# Patient Record
Sex: FEMALE | URBAN - METROPOLITAN AREA
[De-identification: names, ages, dates, MRNs, and addresses within clinical notes are randomized per-mention and may not be internally consistent; named-entity substitution may affect disease eponyms.]

---

## 2018-11-16 ENCOUNTER — IMPORTED ENCOUNTER (OUTPATIENT)
Dept: URBAN - METROPOLITAN AREA CLINIC 9 | Facility: CLINIC | Age: 33
End: 2018-11-16

## 2019-08-15 NOTE — PATIENT DISCUSSION
Monitor with Dr Robinson Jin. Try new RGP to get improved VA. IF not, may be cataract per Dr Robinson Jin. Wants RGP for OU.

## 2019-08-29 NOTE — PATIENT DISCUSSION
Monitor with Dr Leni Chance. Try new RGP to get improved VA. IF not, may be cataract per Dr Leni Chance. Wants RGP for OU.

## 2019-12-12 NOTE — PATIENT DISCUSSION
Monitor with Dr Brigido Latif. Try new RGP to get improved VA. IF not, may be cataract per Dr Brigido Latif. Wants RGP for OU.

## 2021-10-16 ASSESSMENT — VISUAL ACUITY
OD_SC: 20/40 SN
OD_CC: 20/20 SN
OS_CC: 20/20 SN
OS_SC: 20/40 SN

## 2021-10-16 ASSESSMENT — TONOMETRY
OS_IOP_MMHG: 13
OD_IOP_MMHG: 13

## 2021-10-16 ASSESSMENT — KERATOMETRY
OS_K1POWER_DIOPTERS: 43.25
OS_AXISANGLE_DEGREES: 178
OD_K2POWER_DIOPTERS: 43.5
OD_K1POWER_DIOPTERS: 43
OS_AXISANGLE2_DEGREES: 88
OD_AXISANGLE2_DEGREES: 85
OD_AXISANGLE_DEGREES: 175
OS_K2POWER_DIOPTERS: 43.5

## 2022-06-22 ENCOUNTER — EMERGENCY VISIT (OUTPATIENT)
Dept: URBAN - METROPOLITAN AREA CLINIC 10 | Facility: CLINIC | Age: 37
End: 2022-06-22

## 2022-06-22 DIAGNOSIS — H10.33: ICD-10-CM

## 2022-06-22 PROCEDURE — 92012 INTRM OPH EXAM EST PATIENT: CPT

## 2022-06-22 ASSESSMENT — VISUAL ACUITY
OD_CC: 20/25-2
OU_CC: 20/20-1
OS_CC: 20/20-1

## 2022-06-24 RX ORDER — EPINEPHRINE 0.3 MG/.3ML
INJECTION SUBCUTANEOUS
COMMUNITY

## 2022-06-24 RX ORDER — FLUTICASONE PROPIONATE 100 MCG
1 BLISTER, WITH INHALATION DEVICE INHALATION
COMMUNITY
End: 2022-10-07

## 2022-06-24 RX ORDER — LEVOCETIRIZINE DIHYDROCHLORIDE 5 MG/1
TABLET, FILM COATED ORAL
COMMUNITY
End: 2022-10-07

## 2022-06-24 RX ORDER — FLUTICASONE PROPIONATE 50 MCG
SPRAY, SUSPENSION (ML) NASAL
COMMUNITY
End: 2022-10-07

## 2022-06-24 RX ORDER — AMLODIPINE BESYLATE 5 MG/1
TABLET ORAL
COMMUNITY
Start: 2021-08-27 | End: 2022-10-07

## 2022-07-02 PROBLEM — N92.6 IRREGULAR PERIODS: Status: ACTIVE | Noted: 2022-07-02

## 2022-07-02 PROBLEM — J30.9 ALLERGIC RHINITIS: Status: ACTIVE | Noted: 2022-07-02

## 2022-07-02 PROBLEM — M54.31 SCIATICA OF RIGHT SIDE: Status: ACTIVE | Noted: 2022-07-02

## 2022-07-02 PROBLEM — M54.41 ACUTE RIGHT-SIDED LOW BACK PAIN WITH RIGHT-SIDED SCIATICA: Status: ACTIVE | Noted: 2022-07-02

## 2022-07-02 PROBLEM — R93.89 ABNORMAL MRI: Status: ACTIVE | Noted: 2022-07-02

## 2022-07-02 PROBLEM — D50.0 IRON DEFICIENCY ANEMIA DUE TO CHRONIC BLOOD LOSS: Status: ACTIVE | Noted: 2022-07-02

## 2022-07-02 PROBLEM — N97.9 FEMALE INFERTILITY: Status: ACTIVE | Noted: 2022-07-02

## 2022-07-02 PROBLEM — E66.01 MORBID OBESITY (HCC): Status: ACTIVE | Noted: 2022-07-02

## 2022-07-02 PROBLEM — E78.5 BORDERLINE HYPERLIPIDEMIA: Status: ACTIVE | Noted: 2022-07-02

## 2022-07-02 PROBLEM — E88.81 INSULIN RESISTANCE: Status: ACTIVE | Noted: 2022-07-02

## 2022-07-02 PROBLEM — J30.1 SEASONAL ALLERGIC RHINITIS DUE TO POLLEN: Status: ACTIVE | Noted: 2022-07-02

## 2022-07-02 PROBLEM — G43.009 MIGRAINE WITHOUT AURA, NOT REFRACTORY: Status: ACTIVE | Noted: 2022-07-02

## 2022-07-02 PROBLEM — E66.9 OBESITY: Status: ACTIVE | Noted: 2022-07-02

## 2022-07-02 PROBLEM — N39.3 STRESS INCONTINENCE IN FEMALE: Status: ACTIVE | Noted: 2022-07-02

## 2022-07-02 PROBLEM — I10 ESSENTIAL HYPERTENSION: Status: ACTIVE | Noted: 2022-07-02

## 2022-07-02 PROBLEM — J45.909 ASTHMA: Status: ACTIVE | Noted: 2022-07-02

## 2022-07-02 PROBLEM — N02.9 RECURRENT HEMATURIA: Status: ACTIVE | Noted: 2022-07-02

## 2022-08-24 ENCOUNTER — ESTABLISHED PATIENT (OUTPATIENT)
Dept: URBAN - METROPOLITAN AREA CLINIC 10 | Facility: CLINIC | Age: 37
End: 2022-08-24

## 2022-08-24 DIAGNOSIS — H52.13: ICD-10-CM

## 2022-08-24 PROCEDURE — 92014 COMPRE OPH EXAM EST PT 1/>: CPT

## 2022-08-24 PROCEDURE — 92015 DETERMINE REFRACTIVE STATE: CPT

## 2022-08-24 ASSESSMENT — VISUAL ACUITY
OD_CC: 20/25
OU_CC: 20/20
OS_CC: 20/20

## 2022-08-24 ASSESSMENT — KERATOMETRY
OS_AXISANGLE_DEGREES: 170
OS_K1POWER_DIOPTERS: 43.25
OD_K2POWER_DIOPTERS: 43.25
OD_K1POWER_DIOPTERS: 43.00
OD_AXISANGLE_DEGREES: 145
OD_AXISANGLE2_DEGREES: 55
OS_AXISANGLE2_DEGREES: 80
OS_K2POWER_DIOPTERS: 43.75

## 2022-08-24 ASSESSMENT — TONOMETRY
OD_IOP_MMHG: 15
OS_IOP_MMHG: 15

## 2022-09-01 PROBLEM — K76.0 FATTY LIVER: Status: ACTIVE | Noted: 2022-09-01

## 2022-09-01 PROBLEM — T78.40XA ALLERGIES: Status: ACTIVE | Noted: 2022-09-01

## 2022-09-01 PROBLEM — S43.432A TEAR OF LEFT GLENOID LABRUM: Status: ACTIVE | Noted: 2022-09-01

## 2022-09-01 PROBLEM — R73.03 PREDIABETES: Status: ACTIVE | Noted: 2022-09-01

## 2022-09-01 PROBLEM — R31.1 BENIGN MICROSCOPIC HEMATURIA: Status: ACTIVE | Noted: 2022-09-01

## 2022-09-01 PROBLEM — J18.9 PNEUMONIA: Status: ACTIVE | Noted: 2019-05-01

## 2022-09-01 PROBLEM — M75.42 ROTATOR CUFF IMPINGEMENT SYNDROME OF LEFT SHOULDER: Status: ACTIVE | Noted: 2022-09-01

## 2022-10-05 PROBLEM — R63.5 WEIGHT GAIN: Status: ACTIVE | Noted: 2022-10-05

## 2022-10-07 PROBLEM — E66.9 OBESITY: Status: RESOLVED | Noted: 2022-07-02 | Resolved: 2022-10-07

## 2022-10-07 PROBLEM — R63.5 WEIGHT GAIN: Status: RESOLVED | Noted: 2022-10-05 | Resolved: 2022-10-07

## 2023-05-15 ENCOUNTER — ESTABLISHED PATIENT (OUTPATIENT)
Dept: URBAN - METROPOLITAN AREA CLINIC 10 | Facility: CLINIC | Age: 38
End: 2023-05-15

## 2023-05-15 DIAGNOSIS — H52.13: ICD-10-CM

## 2023-05-15 PROCEDURE — 92015 DETERMINE REFRACTIVE STATE: CPT

## 2023-05-15 PROCEDURE — 92014 COMPRE OPH EXAM EST PT 1/>: CPT

## 2023-05-15 ASSESSMENT — KERATOMETRY
OS_AXISANGLE_DEGREES: 170
OD_K2POWER_DIOPTERS: 43.25
OD_AXISANGLE_DEGREES: 145
OS_K1POWER_DIOPTERS: 43.25
OS_K2POWER_DIOPTERS: 43.75
OD_K1POWER_DIOPTERS: 43.00
OS_AXISANGLE2_DEGREES: 80
OD_AXISANGLE2_DEGREES: 55

## 2023-05-15 ASSESSMENT — TONOMETRY
OD_IOP_MMHG: 16
OS_IOP_MMHG: 16

## 2023-05-15 ASSESSMENT — VISUAL ACUITY
OS_CC: 20/20
OU_CC: 20/20
OD_CC: 20/20

## 2024-06-12 ENCOUNTER — ESTABLISHED PATIENT (OUTPATIENT)
Dept: URBAN - METROPOLITAN AREA CLINIC 10 | Facility: CLINIC | Age: 39
End: 2024-06-12

## 2024-06-12 DIAGNOSIS — H52.13: ICD-10-CM

## 2024-06-12 PROCEDURE — 92015 DETERMINE REFRACTIVE STATE: CPT

## 2024-06-12 PROCEDURE — 92014 COMPRE OPH EXAM EST PT 1/>: CPT

## 2024-06-12 ASSESSMENT — KERATOMETRY
OS_K2POWER_DIOPTERS: 43.75
OS_AXISANGLE2_DEGREES: 82
OS_AXISANGLE_DEGREES: 172
OD_K2POWER_DIOPTERS: 43.50
OD_AXISANGLE_DEGREES: 157
OD_K1POWER_DIOPTERS: 43.25
OD_AXISANGLE2_DEGREES: 67
OS_K1POWER_DIOPTERS: 43.25

## 2024-06-12 ASSESSMENT — VISUAL ACUITY
OS_SC: 20/40
OD_SC: 20/40-2
OU_SC: 20/40

## 2024-06-12 ASSESSMENT — TONOMETRY
OD_IOP_MMHG: 13
OS_IOP_MMHG: 14